# Patient Record
Sex: FEMALE | Race: WHITE
[De-identification: names, ages, dates, MRNs, and addresses within clinical notes are randomized per-mention and may not be internally consistent; named-entity substitution may affect disease eponyms.]

---

## 2019-08-01 ENCOUNTER — HOSPITAL ENCOUNTER (OUTPATIENT)
Dept: HOSPITAL 92 - L&D/OP | Age: 24
Discharge: HOME | End: 2019-08-01
Attending: OBSTETRICS & GYNECOLOGY
Payer: COMMERCIAL

## 2019-08-01 VITALS — BODY MASS INDEX: 22.3 KG/M2

## 2019-08-01 VITALS — TEMPERATURE: 98.4 F | SYSTOLIC BLOOD PRESSURE: 106 MMHG | DIASTOLIC BLOOD PRESSURE: 69 MMHG

## 2019-08-01 DIAGNOSIS — O48.0: ICD-10-CM

## 2019-08-01 DIAGNOSIS — O47.1: Primary | ICD-10-CM

## 2019-08-01 DIAGNOSIS — Z3A.40: ICD-10-CM

## 2019-08-01 PROCEDURE — 99283 EMERGENCY DEPT VISIT LOW MDM: CPT

## 2019-08-01 NOTE — PRG
DATE OF SERVICE:  08/01/2019



PRIMARY OB:  Syl Hunt CNM



CHIEF COMPLAINT:  Abdominal pain.



HISTORY OF PRESENT ILLNESS:  The patient is a 23-year-old G3, P2 female with an

intrauterine pregnancy at 40 weeks and 5 days, who is presenting to Labor and

Delivery with uterine contractions.  The patient reports that she has been having

contractions since earlier this morning or late last night and is here to evaluate

for labor.  The patient denies any vaginal bleeding.  She has had a mucusy

discharge.  Denies any complications with this pregnancy. 



PAST MEDICAL HISTORY:  Significant for pyelonephritis in a previous pregnancy.



PAST SURGICAL HISTORY:  Negative.



ALLERGIES:  NO KNOWN DRUG ALLERGIES.



SOCIAL HISTORY:  Denies drug, alcohol or tobacco use.



MEDICATIONS:  Prenatal vitamins.



REVIEW OF SYSTEMS:  The patient denies any fever, fall, headache, chest pain,

shortness of breath, significant nausea, vomiting, diarrhea, constipation, hip

problems, knee problems, muscle weakness, any new rashes, any vaginal bleeding,

urinary urgency or frequency. 



PHYSICAL EXAMINATION:

VITAL SIGNS:  Blood pressure 106/69, heart rate of 79, respiratory rate 18, and

temperature 98.4. 

GENERAL:  She appears to be in no acute distress.  She is alert, oriented,

cooperative, and pleasant to interact with. 

HEAD:  Normocephalic and atraumatic. 

LUNGS:  Clear to auscultation bilaterally. 

HEART:  Regular rate and rhythm. 

ABDOMEN:  Gravid and soft, nontender. 

EXTREMITIES:  Nontender and nonedematous. 

CERVICAL:  Cervical exam per nursing staff is 2, 50 , and -3 station. 



Fetal heart tracing shows fetus with a baseline in the 130s with moderate long-term

variability, positive 15 x 15 accelerations, no decelerations.  Contractions are

appearing about every 8 to 10 minutes. 



ASSESSMENT AND PLAN:  The patient is a 23-year-old G3, P2 female with an

intrauterine pregnancy at 40 weeks and 5 days, who is here for evaluation of labor.

The patient at this point has no evidence of active labor.  She will be re-evaluated

in the next couple of hours for cervical change.  Should the patient experience no

change, she will be discharged home.  She has followup with her primary OB in the

very near future, Friday.  She has been given term labor precautions.  Fetus has a

reactive NST and category 1 tracing. 







Job ID:  [id number]